# Patient Record
Sex: FEMALE | URBAN - METROPOLITAN AREA
[De-identification: names, ages, dates, MRNs, and addresses within clinical notes are randomized per-mention and may not be internally consistent; named-entity substitution may affect disease eponyms.]

---

## 2017-12-04 PROBLEM — Z00.00 ENCOUNTER FOR PREVENTIVE HEALTH EXAMINATION: Status: ACTIVE | Noted: 2017-12-04

## 2018-05-09 ENCOUNTER — INPATIENT (INPATIENT)
Facility: HOSPITAL | Age: 28
LOS: 1 days | Discharge: HOME | End: 2018-05-11
Attending: OBSTETRICS & GYNECOLOGY | Admitting: OBSTETRICS & GYNECOLOGY
Payer: MEDICAID

## 2018-05-09 VITALS
DIASTOLIC BLOOD PRESSURE: 63 MMHG | TEMPERATURE: 98 F | RESPIRATION RATE: 16 BRPM | HEIGHT: 62 IN | WEIGHT: 138.89 LBS | SYSTOLIC BLOOD PRESSURE: 104 MMHG | HEART RATE: 71 BPM

## 2018-05-09 LAB
APPEARANCE UR: (no result)
BASOPHILS # BLD AUTO: 0.02 K/UL — SIGNIFICANT CHANGE UP (ref 0–0.2)
BASOPHILS NFR BLD AUTO: 0.2 % — SIGNIFICANT CHANGE UP (ref 0–1)
BILIRUB UR-MCNC: NEGATIVE — SIGNIFICANT CHANGE UP
COLOR SPEC: YELLOW — SIGNIFICANT CHANGE UP
DIFF PNL FLD: NEGATIVE — SIGNIFICANT CHANGE UP
EOSINOPHIL # BLD AUTO: 0.06 K/UL — SIGNIFICANT CHANGE UP (ref 0–0.7)
EOSINOPHIL NFR BLD AUTO: 0.6 % — SIGNIFICANT CHANGE UP (ref 0–8)
GLUCOSE UR QL: NEGATIVE MG/DL — SIGNIFICANT CHANGE UP
HCT VFR BLD CALC: 36.9 % — LOW (ref 37–47)
HGB BLD-MCNC: 12.7 G/DL — SIGNIFICANT CHANGE UP (ref 12–16)
IMM GRANULOCYTES NFR BLD AUTO: 0.6 % — HIGH (ref 0.1–0.3)
KETONES UR-MCNC: NEGATIVE — SIGNIFICANT CHANGE UP
LEUKOCYTE ESTERASE UR-ACNC: (no result)
LYMPHOCYTES # BLD AUTO: 1.74 K/UL — SIGNIFICANT CHANGE UP (ref 1.2–3.4)
LYMPHOCYTES # BLD AUTO: 18.7 % — LOW (ref 20.5–51.1)
MCHC RBC-ENTMCNC: 32.6 PG — HIGH (ref 27–31)
MCHC RBC-ENTMCNC: 34.4 G/DL — SIGNIFICANT CHANGE UP (ref 32–37)
MCV RBC AUTO: 94.6 FL — SIGNIFICANT CHANGE UP (ref 81–99)
MONOCYTES # BLD AUTO: 0.48 K/UL — SIGNIFICANT CHANGE UP (ref 0.1–0.6)
MONOCYTES NFR BLD AUTO: 5.2 % — SIGNIFICANT CHANGE UP (ref 1.7–9.3)
NEUTROPHILS # BLD AUTO: 6.94 K/UL — HIGH (ref 1.4–6.5)
NEUTROPHILS NFR BLD AUTO: 74.7 % — SIGNIFICANT CHANGE UP (ref 42.2–75.2)
NITRITE UR-MCNC: NEGATIVE — SIGNIFICANT CHANGE UP
PCP SPEC-MCNC: SIGNIFICANT CHANGE UP
PH UR: 7 — SIGNIFICANT CHANGE UP (ref 5–8)
PLATELET # BLD AUTO: 164 K/UL — SIGNIFICANT CHANGE UP (ref 130–400)
PRENATAL SYPHILIS TEST: SIGNIFICANT CHANGE UP
PROT UR-MCNC: NEGATIVE MG/DL — SIGNIFICANT CHANGE UP
RBC # BLD: 3.9 M/UL — LOW (ref 4.2–5.4)
RBC # FLD: 13.9 % — SIGNIFICANT CHANGE UP (ref 11.5–14.5)
SP GR SPEC: 1.01 — SIGNIFICANT CHANGE UP (ref 1.01–1.03)
TYPE + AB SCN PNL BLD: SIGNIFICANT CHANGE UP
UROBILINOGEN FLD QL: 0.2 MG/DL — SIGNIFICANT CHANGE UP (ref 0.2–0.2)
WBC # BLD: 9.3 K/UL — SIGNIFICANT CHANGE UP (ref 4.8–10.8)
WBC # FLD AUTO: 9.3 K/UL — SIGNIFICANT CHANGE UP (ref 4.8–10.8)

## 2018-05-09 PROCEDURE — 59400 OBSTETRICAL CARE: CPT | Mod: U9

## 2018-05-09 RX ORDER — OXYCODONE AND ACETAMINOPHEN 5; 325 MG/1; MG/1
1 TABLET ORAL
Refills: 0 | Status: DISCONTINUED | OUTPATIENT
Start: 2018-05-09 | End: 2018-05-11

## 2018-05-09 RX ORDER — DIPHENHYDRAMINE HCL 50 MG
25 CAPSULE ORAL EVERY 6 HOURS
Refills: 0 | Status: DISCONTINUED | OUTPATIENT
Start: 2018-05-09 | End: 2018-05-11

## 2018-05-09 RX ORDER — GLYCERIN ADULT
1 SUPPOSITORY, RECTAL RECTAL AT BEDTIME
Refills: 0 | Status: DISCONTINUED | OUTPATIENT
Start: 2018-05-09 | End: 2018-05-11

## 2018-05-09 RX ORDER — IBUPROFEN 200 MG
600 TABLET ORAL EVERY 6 HOURS
Refills: 0 | Status: DISCONTINUED | OUTPATIENT
Start: 2018-05-09 | End: 2018-05-11

## 2018-05-09 RX ORDER — MAGNESIUM HYDROXIDE 400 MG/1
30 TABLET, CHEWABLE ORAL
Refills: 0 | Status: DISCONTINUED | OUTPATIENT
Start: 2018-05-09 | End: 2018-05-11

## 2018-05-09 RX ORDER — PRAMOXINE HYDROCHLORIDE 150 MG/15G
1 AEROSOL, FOAM RECTAL EVERY 4 HOURS
Refills: 0 | Status: DISCONTINUED | OUTPATIENT
Start: 2018-05-09 | End: 2018-05-11

## 2018-05-09 RX ORDER — NALOXONE HYDROCHLORIDE 4 MG/.1ML
0.1 SPRAY NASAL
Refills: 0 | Status: DISCONTINUED | OUTPATIENT
Start: 2018-05-09 | End: 2018-05-11

## 2018-05-09 RX ORDER — DIBUCAINE 1 %
1 OINTMENT (GRAM) RECTAL EVERY 4 HOURS
Refills: 0 | Status: DISCONTINUED | OUTPATIENT
Start: 2018-05-09 | End: 2018-05-11

## 2018-05-09 RX ORDER — DOCUSATE SODIUM 100 MG
100 CAPSULE ORAL
Refills: 0 | Status: DISCONTINUED | OUTPATIENT
Start: 2018-05-09 | End: 2018-05-11

## 2018-05-09 RX ORDER — HYDROCORTISONE 1 %
1 OINTMENT (GRAM) TOPICAL EVERY 4 HOURS
Refills: 0 | Status: DISCONTINUED | OUTPATIENT
Start: 2018-05-09 | End: 2018-05-11

## 2018-05-09 RX ORDER — CITRIC ACID/SODIUM CITRATE 300-500 MG
15 SOLUTION, ORAL ORAL EVERY 4 HOURS
Refills: 0 | Status: DISCONTINUED | OUTPATIENT
Start: 2018-05-09 | End: 2018-05-09

## 2018-05-09 RX ORDER — OXYTOCIN 10 UNIT/ML
41.67 VIAL (ML) INJECTION
Qty: 20 | Refills: 0 | Status: DISCONTINUED | OUTPATIENT
Start: 2018-05-09 | End: 2018-05-11

## 2018-05-09 RX ORDER — AER TRAVELER 0.5 G/1
1 SOLUTION RECTAL; TOPICAL EVERY 4 HOURS
Refills: 0 | Status: DISCONTINUED | OUTPATIENT
Start: 2018-05-09 | End: 2018-05-11

## 2018-05-09 RX ORDER — OXYTOCIN 10 UNIT/ML
2 VIAL (ML) INJECTION
Qty: 30 | Refills: 0 | Status: DISCONTINUED | OUTPATIENT
Start: 2018-05-09 | End: 2018-05-11

## 2018-05-09 RX ORDER — LANOLIN
1 OINTMENT (GRAM) TOPICAL EVERY 6 HOURS
Refills: 0 | Status: DISCONTINUED | OUTPATIENT
Start: 2018-05-09 | End: 2018-05-11

## 2018-05-09 RX ORDER — SIMETHICONE 80 MG/1
80 TABLET, CHEWABLE ORAL EVERY 6 HOURS
Refills: 0 | Status: DISCONTINUED | OUTPATIENT
Start: 2018-05-09 | End: 2018-05-11

## 2018-05-09 RX ORDER — SODIUM CHLORIDE 9 MG/ML
1000 INJECTION, SOLUTION INTRAVENOUS
Refills: 0 | Status: DISCONTINUED | OUTPATIENT
Start: 2018-05-09 | End: 2018-05-09

## 2018-05-09 RX ORDER — ONDANSETRON 8 MG/1
4 TABLET, FILM COATED ORAL EVERY 6 HOURS
Refills: 0 | Status: DISCONTINUED | OUTPATIENT
Start: 2018-05-09 | End: 2018-05-11

## 2018-05-09 RX ORDER — ACETAMINOPHEN 500 MG
650 TABLET ORAL EVERY 6 HOURS
Refills: 0 | Status: DISCONTINUED | OUTPATIENT
Start: 2018-05-09 | End: 2018-05-11

## 2018-05-09 RX ADMIN — Medication 600 MILLIGRAM(S): at 23:15

## 2018-05-09 RX ADMIN — Medication 2 MILLIUNIT(S)/MIN: at 11:16

## 2018-05-09 RX ADMIN — Medication 1 APPLICATION(S): at 23:16

## 2018-05-09 NOTE — OB PROVIDER H&P - HISTORY OF PRESENT ILLNESS
27y  @ 39.1 weeks _ by _, EDC 5/15/18 , IVF treatment, present for IOL for favorable cervix. Patient was conceived with fresh/frozen embryo. Embryo was implanted on day _. # eggs were implanted. 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 GBS _. Denies VB, LOF, and ctx. +FM. Last seen in office by _, exam was _. 27y  @ 39.1 weeks, EDC 5/15/18 , IVF treatment, present for IOL for favorable cervix. G1 shoulder dystocia. Patient was conceived with frozen embryo. Embryo was implanted on day _18. 1 egg implanted. 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 GBS neg. Denies VB, LOF, and ctx. +FM. Last seen in office by 18, exam was .

## 2018-05-09 NOTE — OB PROVIDER DELIVERY SUMMARY - NSPROVIDERDELIVERYNOTE_OBGYN_ALL_OB_FT
pt delivered a viable male infant over midline episiotomy baby op in presentation   placenta delivered intacta nd spont   pt stable mild lochia   uterus firm   episiotomy repaired with 2-0 chromic suture in a usual fashion  pt stable mild lochia uterus firm   baby to reg nursery.

## 2018-05-09 NOTE — PROCEDURE NOTE - NSSITEPREP_SKIN_A_CORE
Povidone iodine x3/Adherence to aseptic technique: hand hygiene prior to donning barriers (gown, gloves), don cap and mask, sterile drape over patient

## 2018-05-09 NOTE — PROGRESS NOTE ADULT - SUBJECTIVE AND OBJECTIVE BOX
PGY3 Antepartum Note    Patient seen and examined at bedside in NAD for decel 2/2 to tachysystole. Patient turned to LLP and pitocin discontinued. Patient denies any complaints at this time.     T(F): 97.5 (10:07)  HR: 62 (13:13)  BP: 112/57 (13:13)  RR: 16 (10:07)  EFM: 150, mod.madhu, + accel, decel to 90BPM for 2min  TOCO: q 1-2  SVE: deferred    Medications:  (Floorstock): 1 Each (18 @ 11:01)  oxytocin Infusion: 8 mL/Hr (18 @ 11:00)      Labs:                        12.7   9.30  )-----------( 164      ( 09 May 2018 11:51 )             36.9             Urinalysis Basic - ( 09 May 2018 11:51 )    Color: Yellow / Appearance: Cloudy / S.010 / pH: x  Gluc: x / Ketone: Negative  / Bili: Negative / Urobili: 0.2 mg/dL   Blood: x / Protein: Negative mg/dL / Nitrite: Negative   Leuk Esterase: Large / RBC: 2-5 /HPF / WBC >50 /HPF   Sq Epi: x / Non Sq Epi: Many /HPF / Bacteria: x          A/P:   28yo  at [id number] GA, GBS negative, IOL for favorable cervix at term   - c/w current management  - continuous toco/EFM  - pain management PRN    Will inform Dr. Matthew

## 2018-05-09 NOTE — OB PROVIDER H&P - ASSESSMENT
27y  @ 39.1 weeks, GBS neg, h/o shoulder dystocia G1, IOL for favorable cervix.     Admit to L & D  IV hydration, labs  Continuous EFM & TOCO monitoring  Clear Liquid diet  Pain Management PRN  IOL w/ Pitocin     aware 27y  @ 39.1 weeks, GBS neg, h/o shoulder dystocia G1, IOL for favorable cervix.     Admit to L & D  IV hydration, labs  Continuous EFM & TOCO monitoring  Clear Liquid diet  Pain Management PRN  IOL w/ Pitocin    Dr. Shabazz and Dr. Matthew aware

## 2018-05-09 NOTE — OB PROVIDER H&P - NS_OBGYNHISTORY_OBGYN_ALL_OB_FT
OB Hx:  x 1. Shoulder dystocia 8-6                         Year? GA? /CS? Sex? Weight? Hospital? Complications? Epidural?  G1   G2  G3    Gyn Hx:  Denies cysts, fibroids, abnormal paps, and STIs. OB Hx:  x 1. Shoulder dystocia 8-6                G1 2015 FT  Female 8-6 Soledad Shoulder dystocia, received epidural    Gyn Hx:  h/o ovarian cyst  Denies fibroids, abnormal paps, and STIs.

## 2018-05-09 NOTE — OB PROVIDER H&P - ATTENDING COMMENTS
pt seen and examined   comes in fro a induction of labor  hx of shoulder dystocia last pregnancy  sve 3/70/-1  will augment with pitocin   epi prn   anticipate

## 2018-05-09 NOTE — OB PROVIDER H&P - NSHPLABSRESULTS_GEN_ALL_CORE
GCT: 69    11/3/17 sono @ 12.4 wks: S=D  12/1/17 sono @ 16.3 wks: s=d  1/5/18 sono @ 21 wks: s=d, ant placenta, EFW 433gms, 3 vc, suboptimal kidneys  2/2/18 sono @ 25.3 wks: s=d, apparent kidneys seen b/l  3/19/18 sono @ 31.6 weeks: s=d  4/13/18 sono @ 35.3 weeks: s=d, efw 2960 gms, vtx  4/20/18 sono @ 36.3 wks: s=d  4/27/18 sono @ 37.3 wks: s=d, MVP 5.3cm, EFW 3493 gms  5/4/18 sono @ 38.3 weeks: s=d, vtx

## 2018-05-09 NOTE — OB PROVIDER H&P - NSHPPHYSICALEXAM_GEN_ALL_CORE
T(C): 36.4 (05-09-18 @ 10:07), Max: 36.4 (05-09-18 @ 10:07)  HR: 71 (05-09-18 @ 10:09) (71 - 71)  BP: 104/53 (05-09-18 @ 10:09) (104/53 - 104/63)  RR: 16 (05-09-18 @ 10:07) (16 - 16)      EFM: 130 bpm, moderate variability, +accels  TOCO: none

## 2018-05-09 NOTE — PROCEDURE NOTE - NSLOADDOSE_OBGYN_ALL_OB
13cc/hr (fentanyl added to infusion)/10 ML of 0.125 percent Bupivicaine/Continuous Bupivicaine 0.1 percent/100 Micrograms Fentanyl

## 2018-05-10 LAB
BASOPHILS # BLD AUTO: 0.02 K/UL — SIGNIFICANT CHANGE UP (ref 0–0.2)
BASOPHILS NFR BLD AUTO: 0.2 % — SIGNIFICANT CHANGE UP (ref 0–1)
EOSINOPHIL # BLD AUTO: 0.03 K/UL — SIGNIFICANT CHANGE UP (ref 0–0.7)
EOSINOPHIL NFR BLD AUTO: 0.2 % — SIGNIFICANT CHANGE UP (ref 0–8)
HCT VFR BLD CALC: 32.5 % — LOW (ref 37–47)
HGB BLD-MCNC: 11.1 G/DL — LOW (ref 12–16)
IMM GRANULOCYTES NFR BLD AUTO: 0.5 % — HIGH (ref 0.1–0.3)
LYMPHOCYTES # BLD AUTO: 1.74 K/UL — SIGNIFICANT CHANGE UP (ref 1.2–3.4)
LYMPHOCYTES # BLD AUTO: 13.6 % — LOW (ref 20.5–51.1)
MCHC RBC-ENTMCNC: 32.5 PG — HIGH (ref 27–31)
MCHC RBC-ENTMCNC: 34.2 G/DL — SIGNIFICANT CHANGE UP (ref 32–37)
MCV RBC AUTO: 95 FL — SIGNIFICANT CHANGE UP (ref 81–99)
MONOCYTES # BLD AUTO: 0.66 K/UL — HIGH (ref 0.1–0.6)
MONOCYTES NFR BLD AUTO: 5.2 % — SIGNIFICANT CHANGE UP (ref 1.7–9.3)
NEUTROPHILS # BLD AUTO: 10.27 K/UL — HIGH (ref 1.4–6.5)
NEUTROPHILS NFR BLD AUTO: 80.3 % — HIGH (ref 42.2–75.2)
NRBC # BLD: 0 /100 WBCS — SIGNIFICANT CHANGE UP (ref 0–0)
PLATELET # BLD AUTO: 160 K/UL — SIGNIFICANT CHANGE UP (ref 130–400)
RBC # BLD: 3.42 M/UL — LOW (ref 4.2–5.4)
RBC # FLD: 14 % — SIGNIFICANT CHANGE UP (ref 11.5–14.5)
WBC # BLD: 12.79 K/UL — HIGH (ref 4.8–10.8)
WBC # FLD AUTO: 12.79 K/UL — HIGH (ref 4.8–10.8)

## 2018-05-10 RX ADMIN — Medication 600 MILLIGRAM(S): at 12:10

## 2018-05-10 RX ADMIN — Medication 100 MILLIGRAM(S): at 14:09

## 2018-05-10 RX ADMIN — Medication 1 TABLET(S): at 12:10

## 2018-05-10 NOTE — PROGRESS NOTE ADULT - SUBJECTIVE AND OBJECTIVE BOX
Subjective:   Patient doing well. No complaints. Minimal lochia. Pain controlled.    Objective:   T(F): 96.1 (05-10 @ 08:00), Max: 98 (05 @ 21:43)  HR: 65 (05-10 @ 08:00)  BP: 103/51 (05-10 @ 08:00) (96/58 - 149/51)  RR: 16 (05-10 @ 08:00)  SpO2: --  Gen: AAOx3, NAD  Abd: Soft, Nontender, Nondistended, Fundus firm below the umbilicus  Ext: no tendern, mild edema  Min Lochia Rubra    Labs:  CBC Full  -  ( 09 May 2018 11:51 )  WBC Count : 9.30 K/uL  Hemoglobin : 12.7 g/dL  Hematocrit : 36.9 %  Platelet Count - Automated : 164 K/uL  Mean Cell Volume : 94.6 fL  Mean Cell Hemoglobin : 32.6 pg  Mean Cell Hemoglobin Concentration : 34.4 g/dL  Auto Neutrophil # : 6.94 K/uL  Auto Lymphocyte # : 1.74 K/uL  Auto Monocyte # : 0.48 K/uL  Auto Eosinophil # : 0.06 K/uL  Auto Basophil # : 0.02 K/uL  Auto Neutrophil % : 74.7 %  Auto Lymphocyte % : 18.7 %  Auto Monocyte % : 5.2 %  Auto Eosinophil % : 0.6 %  Auto Basophil % : 0.2 %            Tolerating regular diet  Passed flatus, passed bowel movement  Breast/Bottle feeding    Assessment:   27y s/p , PPD#1, doing well    Plan:  -Routine postpartum care  -Encouraged ambulation and PO hydration  -Tolerating regular diet

## 2018-05-11 ENCOUNTER — TRANSCRIPTION ENCOUNTER (OUTPATIENT)
Age: 28
End: 2018-05-11

## 2018-05-11 VITALS
DIASTOLIC BLOOD PRESSURE: 61 MMHG | HEART RATE: 72 BPM | SYSTOLIC BLOOD PRESSURE: 105 MMHG | RESPIRATION RATE: 16 BRPM | TEMPERATURE: 97 F

## 2018-05-11 RX ORDER — IBUPROFEN 200 MG
1 TABLET ORAL
Qty: 0 | Refills: 0 | DISCHARGE
Start: 2018-05-11

## 2018-05-11 RX ORDER — ACETAMINOPHEN 500 MG
2 TABLET ORAL
Qty: 0 | Refills: 0 | DISCHARGE
Start: 2018-05-11

## 2018-05-11 RX ADMIN — Medication 100 MILLIGRAM(S): at 10:00

## 2018-05-11 NOTE — DISCHARGE NOTE OB - PLAN OF CARE
healthy mother nothing in the vagina for 6 weeks, no tampons, no douching, no baths, no sex. Showers are fine.   Go to the emergency room if you have a temperature greater than 100.4, worsening abdominal pain or increased vaginal bleeding

## 2018-05-11 NOTE — DISCHARGE NOTE OB - MATERIALS PROVIDED
Guide to Postpartum Care/Back To Sleep Handout/Shaken Baby Prevention Handout/Birth Certificate Instructions

## 2018-05-11 NOTE — DISCHARGE NOTE OB - MEDICATION SUMMARY - MEDICATIONS TO TAKE
I will START or STAY ON the medications listed below when I get home from the hospital:    ibuprofen 600 mg oral tablet  -- 1 tab(s) by mouth every 6 hours, As needed, Moderate Pain  -- Indication: For pain    acetaminophen 325 mg oral tablet  -- 2 tab(s) by mouth every 6 hours, As needed, For Temp greater than 38.5 C (101.3 F)  -- Indication: For pain

## 2018-05-11 NOTE — DISCHARGE NOTE OB - CARE PROVIDER_API CALL
Mesfin Matthew), Obstetrics and Gynecology  [address] [place] [address]  Phone: [phone]  Fax: [phone]

## 2018-05-11 NOTE — DISCHARGE NOTE OB - PATIENT PORTAL LINK FT
You can access [facility], offered by [facility], by registering with the following website: [email or web address]

## 2018-05-11 NOTE — DISCHARGE NOTE OB - HOSPITAL COURSE
DATE OF DISCHARGE: 18 @ 07:46    HISTORY OF PRESENT ILLNESS/HOSPITAL COURSE:   27y  @ 39.1 weeks, EDC 5/15/18 , IVF treatment, present for IOL for favorable cervix. G1 shoulder dystocia. Patient was conceived with frozen embryo. Embryo was implanted on day _18. 1 egg implanted. 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 GBS neg. Denies VB, LOF, and ctx. +FM. Last seen in office by 18, exam was . (09 May 2018 10:11)    PAST MEDICAL & SURGICAL HISTORY:  No pertinent past medical history  No significant past surgical history    POST PARTUM COURSE: uncomplicated PP course, discharged home on PPD2       LABS:                        11.1   12.79 )-----------( 160      ( 10 May 2018 12:24 )             32.5                         12.7   9.30  )-----------( 164      ( 09 May 2018 11:51 )             36.9

## 2018-05-15 DIAGNOSIS — Z3A.39 39 WEEKS GESTATION OF PREGNANCY: ICD-10-CM

## 2018-05-15 DIAGNOSIS — Z34.83 ENCOUNTER FOR SUPERVISION OF OTHER NORMAL PREGNANCY, THIRD TRIMESTER: ICD-10-CM

## 2018-05-15 DIAGNOSIS — O26.893 OTHER SPECIFIED PREGNANCY RELATED CONDITIONS, THIRD TRIMESTER: ICD-10-CM

## 2021-02-23 NOTE — OB PROVIDER H&P - VDRL/RPR: DATE, OB PROFILE
Pt awake and alert. VSS. Right groin no bleed no hematoma. Bandaid in place. Pt tolerating po well. Ambulated to bathroom without difficulty. Voided without difficulty. Discharge instructions given to pt. Verbalized understanding.Discharged as ordered.   03-Oct-2017